# Patient Record
Sex: FEMALE | Race: WHITE | NOT HISPANIC OR LATINO
[De-identification: names, ages, dates, MRNs, and addresses within clinical notes are randomized per-mention and may not be internally consistent; named-entity substitution may affect disease eponyms.]

---

## 2017-08-15 ENCOUNTER — APPOINTMENT (OUTPATIENT)
Dept: PEDIATRICS | Facility: CLINIC | Age: 22
End: 2017-08-15
Payer: COMMERCIAL

## 2017-08-15 VITALS
HEART RATE: 74 BPM | DIASTOLIC BLOOD PRESSURE: 68 MMHG | WEIGHT: 160.8 LBS | SYSTOLIC BLOOD PRESSURE: 124 MMHG | BODY MASS INDEX: 24.37 KG/M2 | HEIGHT: 68 IN

## 2017-08-15 DIAGNOSIS — Z91.018 ALLERGY TO OTHER FOODS: ICD-10-CM

## 2017-08-15 PROCEDURE — 90621 MENB-FHBP VACC 2/3 DOSE IM: CPT

## 2017-08-15 PROCEDURE — 96127 BRIEF EMOTIONAL/BEHAV ASSMT: CPT

## 2017-08-15 PROCEDURE — 99395 PREV VISIT EST AGE 18-39: CPT | Mod: 25

## 2017-08-15 PROCEDURE — 96160 PT-FOCUSED HLTH RISK ASSMT: CPT | Mod: 59

## 2017-08-15 PROCEDURE — 90471 IMMUNIZATION ADMIN: CPT

## 2018-08-16 ENCOUNTER — APPOINTMENT (OUTPATIENT)
Dept: PEDIATRICS | Facility: CLINIC | Age: 23
End: 2018-08-16
Payer: COMMERCIAL

## 2018-08-16 VITALS
HEIGHT: 68.25 IN | DIASTOLIC BLOOD PRESSURE: 85 MMHG | SYSTOLIC BLOOD PRESSURE: 132 MMHG | WEIGHT: 151.25 LBS | HEART RATE: 83 BPM | BODY MASS INDEX: 22.92 KG/M2

## 2018-08-16 DIAGNOSIS — Z00.00 ENCOUNTER FOR GENERAL ADULT MEDICAL EXAMINATION W/OUT ABNORMAL FINDINGS: ICD-10-CM

## 2018-08-16 PROCEDURE — 99395 PREV VISIT EST AGE 18-39: CPT

## 2018-08-16 PROCEDURE — 96160 PT-FOCUSED HLTH RISK ASSMT: CPT | Mod: 59

## 2018-08-16 PROCEDURE — 96127 BRIEF EMOTIONAL/BEHAV ASSMT: CPT

## 2018-08-16 NOTE — DEVELOPMENTAL MILESTONES
[0] : 2) Feeling down, depressed, or hopeless: Not at all (0) [[id number]] : 0 [[id number]] : 0 [Eats meals with family] : eats meals with family [Has famliy member/adult to turn to for help] : has family member/adult to turn to for help [Is permitted and is able to make independent decisions] : is permitted and is able to make independent decisions [Has friends] : has friends [At least 1 hour of physical acitvity/day] : at least 1 hour of physical activity/day [Sexually Active] : The patient is sexually active [Always] : always [Vaginal] : vaginal [Has ways to cope with stress] : has ways to cope with stress [Displays self-confidence] : displays self-confidence [Gets depressed, anxious, or irritable / has mood swings] : does not get depressed, anxious, or irritable / has no mood swings [Has thoughts about hurting self or considered suicide] : has no thoughts about hurting self or considered suicide

## 2018-08-16 NOTE — DISCUSSION/SUMMARY
[Normal Growth] : growth [Normal Development] : development  [None] : No known medical problems [No Elimination Concerns] : elimination [No Feeding Concerns] : feeding [No Skin Concerns] : skin [Normal Sleep Pattern] : sleep [Physical Growth and Development] : physical growth and development [Social and Academic Competence] : social and academic competence [Emotional Well-Being] : emotional well-being [Risk Reduction] : risk reduction [Violence and Injury Prevention] : violence and injury prevention [FreeTextEntry1] : spoke with patient about drinking and sexual activity and condom use. When all the immunizations being up to date. The forms for school spelled out. Patient to transfer to internal medicine. Patient understands the plan.

## 2018-08-16 NOTE — HISTORY OF PRESENT ILLNESS
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

## 2019-05-09 ENCOUNTER — MESSAGE (OUTPATIENT)
Age: 24
End: 2019-05-09